# Patient Record
Sex: MALE | Race: WHITE | Employment: FULL TIME | URBAN - METROPOLITAN AREA
[De-identification: names, ages, dates, MRNs, and addresses within clinical notes are randomized per-mention and may not be internally consistent; named-entity substitution may affect disease eponyms.]

---

## 2017-08-01 ENCOUNTER — OFFICE VISIT (OUTPATIENT)
Dept: ORTHOPEDIC SURGERY | Age: 37
End: 2017-08-01
Payer: COMMERCIAL

## 2017-08-01 VITALS
HEART RATE: 59 BPM | HEIGHT: 64 IN | SYSTOLIC BLOOD PRESSURE: 109 MMHG | DIASTOLIC BLOOD PRESSURE: 71 MMHG | OXYGEN SATURATION: 97 % | BODY MASS INDEX: 28 KG/M2 | WEIGHT: 164 LBS

## 2017-08-01 DIAGNOSIS — R26.89 FUNCTIONAL GAIT ABNORMALITY: Primary | ICD-10-CM

## 2017-08-01 DIAGNOSIS — G57.02 PIRIFORMIS SYNDROME OF LEFT SIDE: ICD-10-CM

## 2017-08-01 DIAGNOSIS — S76.312A LEFT HAMSTRING MUSCLE STRAIN, INITIAL ENCOUNTER: ICD-10-CM

## 2017-08-01 DIAGNOSIS — M21.70 LEG LENGTH DISCREPANCY: ICD-10-CM

## 2017-08-01 PROCEDURE — G8419 CALC BMI OUT NRM PARAM NOF/U: HCPCS | Performed by: FAMILY MEDICINE

## 2017-08-01 PROCEDURE — G8427 DOCREV CUR MEDS BY ELIG CLIN: HCPCS | Performed by: FAMILY MEDICINE

## 2017-08-01 PROCEDURE — 99203 OFFICE O/P NEW LOW 30 MIN: CPT | Performed by: FAMILY MEDICINE

## 2017-08-01 PROCEDURE — 1036F TOBACCO NON-USER: CPT | Performed by: FAMILY MEDICINE

## 2017-08-01 RX ORDER — ELECTROLYTES/DEXTROSE
1 SOLUTION, ORAL ORAL
COMMUNITY

## 2017-08-01 ASSESSMENT — PATIENT HEALTH QUESTIONNAIRE - PHQ9
1. LITTLE INTEREST OR PLEASURE IN DOING THINGS: 0
SUM OF ALL RESPONSES TO PHQ QUESTIONS 1-9: 0
SUM OF ALL RESPONSES TO PHQ9 QUESTIONS 1 & 2: 0
2. FEELING DOWN, DEPRESSED OR HOPELESS: 0

## 2017-08-02 ENCOUNTER — HOSPITAL ENCOUNTER (OUTPATIENT)
Dept: PHYSICAL THERAPY | Facility: CLINIC | Age: 37
Setting detail: THERAPIES SERIES
Discharge: HOME | End: 2017-08-02

## 2017-08-02 PROCEDURE — 97750 PHYSICAL PERFORMANCE TEST: CPT

## 2017-08-02 PROCEDURE — 97110 THERAPEUTIC EXERCISES: CPT

## 2017-08-02 PROCEDURE — 97161 PT EVAL LOW COMPLEX 20 MIN: CPT

## 2017-08-02 PROCEDURE — 9990000011 HC NO CHARGE THERAPY VISIT

## 2017-08-11 ENCOUNTER — HOSPITAL ENCOUNTER (OUTPATIENT)
Dept: PHYSICAL THERAPY | Facility: CLINIC | Age: 37
Setting detail: THERAPIES SERIES
Discharge: HOME | End: 2017-08-11

## 2017-08-11 PROCEDURE — 9990000011 HC NO CHARGE THERAPY VISIT

## 2017-08-25 ENCOUNTER — HOSPITAL ENCOUNTER (OUTPATIENT)
Dept: PHYSICAL THERAPY | Facility: CLINIC | Age: 37
Setting detail: THERAPIES SERIES
Discharge: HOME | End: 2017-08-25

## 2017-08-25 PROCEDURE — 9990000011 HC NO CHARGE THERAPY VISIT

## 2017-09-15 ENCOUNTER — HOSPITAL ENCOUNTER (OUTPATIENT)
Dept: PHYSICAL THERAPY | Facility: CLINIC | Age: 37
Setting detail: THERAPIES SERIES
Discharge: HOME | End: 2017-09-15

## 2017-09-15 PROCEDURE — 9990000011 HC NO CHARGE THERAPY VISIT

## 2017-10-13 ENCOUNTER — HOSPITAL ENCOUNTER (OUTPATIENT)
Dept: PHYSICAL THERAPY | Facility: CLINIC | Age: 37
Setting detail: THERAPIES SERIES
Discharge: HOME | End: 2017-10-13

## 2017-10-13 PROCEDURE — 9990000011 HC NO CHARGE THERAPY VISIT

## 2017-10-13 NOTE — FLOWSHEET NOTE
[x] [facility]  [address] [facility]   Phone: [phone]   Fax:  [phone]     Physical Therapy Daily Treatment Note    Date:  10/13/2017  Patient Name:  Chris Cortez    :  1980  MRN: [id number]  Physician: Dr. Azalea rod  Medical Diagnosis: L HS strain, L piriformis strain, functional gait abnormality           Rehab Codes: R26.89, G57.02, S76.312A  Onset date: 2017                            Next Dr's appt.: none  Visit# / total visits:    Cancels/No Shows: 0/0    Subjective:    Pain:  [x] Yes  [] No Location: L HS   Pain Rating: (0-10 scale) 3/10  Worst 7/10 after the race when sitting. Pain altered Tx:  [x] No  [] Yes  Action:  Comments:  Improved compliance with HEP, but still limited. Ran [place] 10k and had a great deal of pain afterwards.       Objective:  Modalities: none  Precautions:standard  Exercises:no ex today, only manual   Exercise Reps/ Time Weight/ Level Comments   Prone         Eccentric HS MRE  1xfatigue x     HIp ext glut max 15 -- added   Gym         Split squats 20 x    Nordic HS curls  15   HEP   CC HS mud scrapes 2x20 15 lbs Marc; for eccentric HS strengthening HEP   Monster walks 4 widths  gold HEP   Lunge walk with SB 6 widths  With ipsilat trunk rotation   Clocks 5x  12-1-2-3-4-5-6-7 HEP   Cups in  L SLS on blue foam 3x5  To 12 o'clock HEP   3 way step downs 15x x HEP 6\"   Supine         HS curls on SB 20 x HEP 9/15   HS walkouts 10  -- HEP   SKTC in static bridge 15  DC HEP   2 legged bridges on SB 20  x HEP Green   Static bridge on SB w/ alt SKTC 15  -- HEP Green   Other: MMT L HS  90 4/5 10 3+ R is 5/5 at 90 and 4+/5 at 10 deg   Manual: DI to L iliacus, psoas, HS, and glut max  Post MMT L HS at 90 5/5 10 deg 4+/5 hip ext improved to 4+   Specific Instructions for next treatment:    Treatment Charges: Mins Units   []  Modalities     []  Ther Exercise     [x]  Manual Therapy 40 3   []

## 2017-11-17 ENCOUNTER — HOSPITAL ENCOUNTER (OUTPATIENT)
Dept: PHYSICAL THERAPY | Facility: CLINIC | Age: 37
Setting detail: THERAPIES SERIES
Discharge: HOME | End: 2017-11-17

## 2017-11-17 PROCEDURE — 9990000011 HC NO CHARGE THERAPY VISIT

## 2017-11-17 NOTE — FLOWSHEET NOTE
[x] [facility] [address] [facility]  [address] [place] [address]   Phone: [phone]   Fax:  [phone]     Physical Therapy Daily Treatment Note    Date:  2017  Patient Name:  Adan Fernandez    :  1980  MRN: [id number]  Physician: Dr. Mikel rod  Medical Diagnosis: L HS strain, L piriformis strain, functional gait abnormality           Rehab Codes: R26.89, G57.02, S76.312A  Onset date: 2017                            Next Dr's appt.: none  Visit# / total visits:    Cancels/No Shows: 0/0    Subjective:    Pain:  [x] Yes  [] No Location: L HS   Pain Rating: (0-10 scale) 0/10  Worst 1-2/10 and random    Pain altered Tx:  [x] No  [] Yes  Action:  Comments:  Ran 8 days consecutive and ran [place] at same pace as last year. Pain with running has improved significantly.   Sitting is still somewhat uncomfortable, but his job has required him to be at the     Objective:  Modalities: none  Precautions:standard  Exercises:no ex today, only manual   Exercise Reps/ Time Weight/ Level Comments   Prone         Eccentric HS MRE  1xfatigue x     HIp ext glut max 15 -- added   Gym         Split squats 20 x    Nordic HS curls  15 x  HEP   CC HS mud scrapes 2x20 15 lbs Marc; for eccentric HS strengthening HEP   Monster walks 4 widths   HEP   Lunge walk with bar 4 widths 12 lbs    Clocks 5x  12-1-2-3-4-5-6-7 HEP   Cups in  L SLS on blue foam 3x5  To 12 o'clock HEP   3 way step downs 15x x HEP 6\"   Supine         HS curls on SB 20 x HEP 9/15   HS walkouts 10  5x HEP   SKTC in static bridge 15  DC HEP   2 legged bridges on SB 20  x HEP Green 2 ways    Static bridge on SB w/ alt SKTC 15  -- HEP Green   Other: MMT L HS at 90 and 10 de/5   Manual: DI to Marc iliacus    Specific Instructions for next treatment:    Treatment Charges: Mins Units   []  Modalities     [x]  Ther Exercise 50 3   []  Manual Therapy     []  Ther Activities     []  Aquatics     [] Vasocompression     []  Other     Total Treatment time 50 3       Assessment: [x] Progressing toward goals. Client's run tolerance has improved drastically. He does continue to demonstrate some instability with his ex, such as with step downs, split squats and eccentric hamstring ex. [] No change. [] Other:    STG: (to be met in 5 treatments)  1. ? Pain: <3/10 at the worst MET   2. ? Strength:to 5/5 with all hip and HS movements MET  3. ? Function: able to run 2 miles without increased pain. MET  4. Independent with Home Exercise Programs     LTG: (to be met in 8 treatments)  1. Resume full running and cycling program    Pt. Education:  [x] Yes  [] No  [] Reviewed Prior HEP/Ed  Method of Education: [x] Verbal  [] Demo  [] Written     Comprehension of Education:  [] Verbalizes understanding. [] Demonstrates understanding. [] Needs review. [x] Demonstrates/verbalizes HEP/Ed previously given. Plan: [x] Continue per plan of care. He wishes to be seen in 4 wks. If he continues to improve and is able to resume a normal training schedule, DC to HEP on next visit.     [] Other:      Time In:0900  Time Out: 1000  Electronically signed by:  Lidya Llamas PT

## 2017-12-18 ENCOUNTER — HOSPITAL ENCOUNTER (OUTPATIENT)
Dept: PHYSICAL THERAPY | Facility: CLINIC | Age: 37
Setting detail: THERAPIES SERIES
Discharge: HOME | End: 2017-12-18

## 2017-12-26 ENCOUNTER — HOSPITAL ENCOUNTER (OUTPATIENT)
Dept: PHYSICAL THERAPY | Facility: CLINIC | Age: 37
Setting detail: THERAPIES SERIES
Discharge: HOME | End: 2017-12-26

## 2017-12-26 PROCEDURE — 9990000011 HC NO CHARGE THERAPY VISIT

## 2017-12-26 NOTE — FLOWSHEET NOTE
[x] [facility]  [address] [place] [address]   Phone: [phone]   Fax:  [phone]     Physical Therapy Daily Treatment Note    Date:  2017  Patient Name:  Ashley Hodges    :  1980  MRN: [id number]  Physician: Dr. Aurea Comer pay  Medical Diagnosis: L HS strain, L piriformis strain, functional gait abnormality           Rehab Codes: R26.89, G57.02, S76.312A  Onset date: 2017                            Next Dr's appt.: none  Visit# / total visits:    Cancels/No Shows: 0/0    Subjective:    Pain:  [x] Yes  [] No Location: L HS   Pain Rating: (0-10 scale) 0/10  Worst 1-2/10 and random    Pain altered Tx:  [x] No  [] Yes  Action:  Comments:  Signing up for full [facility]. Hamstring has been holding up well. Running 5k every day (7 days/wk)at 8:40 mile.   He reports compliance with HEP is fair at best.  As for goals for today, he would like to review HEP        Objective:  Modalities: none  Precautions:standard  Exercises:no ex today, only manual   Exercise Reps/ Time Weight/ Level Comments   Lat elliptical 8' L3 Alt every 2 min   Prone         Eccentric HS MRE  1xfatigue      HIp ext  2x15 x Alt knee extended and flexed   Gym         Split squats 20 x    Nordic HS curls  15 x  HEP; con and eccentric   CC HS mud scrapes 2x20 10lbs Marc; for eccentric HS strengthening HEP   Monster walks 4 widths   HEP   Lunge walk with bar 4 widths 12 lbs    Clocks 5x  12-1-2-3-4-5-6-7 HEP   Cups in  L SLS on blue foam 5x5 x To 12 o'clock HEP   3 way step downs 15x x HEP 6\"   Supine         HS curls on SB 20 x HEP 9/15   HS walkouts 10 x HEP   SKTC in static bridge 15  DC HEP   2 legged bridges on SB 20  x HEP Green 2 ways    [employer] 20 x    Other: Manual: DI to Marc iliacus    Specific Instructions for next treatment:    Treatment Charges: Mins Units   []  Modalities     []  Ther Exercise     []  Manual Therapy     []  Ther Activities     []

## 2019-03-08 ENCOUNTER — HOSPITAL ENCOUNTER (OUTPATIENT)
Dept: PHYSICAL THERAPY | Facility: CLINIC | Age: 39
Discharge: HOME | End: 2019-03-08

## 2019-03-08 PROCEDURE — 9900000073 HC MANUAL THERAPY PER 15 MIN (SELF-PAY)

## 2019-03-08 PROCEDURE — 9900000066 HC EVALUATION (SELF-PAY)

## 2019-03-11 ENCOUNTER — HOSPITAL ENCOUNTER (OUTPATIENT)
Dept: PHYSICAL THERAPY | Facility: CLINIC | Age: 39
Discharge: HOME | End: 2019-03-11

## 2019-03-11 PROCEDURE — 9900000067 HC THERAPEUTIC EXERCISE EA 15 MINS (SELF-PAY)

## 2019-03-11 PROCEDURE — 9900000073 HC MANUAL THERAPY PER 15 MIN (SELF-PAY)

## 2019-03-18 ENCOUNTER — HOSPITAL ENCOUNTER (OUTPATIENT)
Dept: PHYSICAL THERAPY | Facility: CLINIC | Age: 39
Discharge: HOME | End: 2019-03-18

## 2019-03-18 PROCEDURE — 9900000067 HC THERAPEUTIC EXERCISE EA 15 MINS (SELF-PAY)

## 2019-04-01 ENCOUNTER — HOSPITAL ENCOUNTER (OUTPATIENT)
Dept: PHYSICAL THERAPY | Facility: CLINIC | Age: 39
Discharge: HOME | End: 2019-04-01

## 2019-04-01 PROCEDURE — 9900000067 HC THERAPEUTIC EXERCISE EA 15 MINS (SELF-PAY)

## 2019-04-01 NOTE — FLOWSHEET NOTE
S 3x30\"   4/1 x     Monster walks  4x25' blue 3/18   issued blue TB   Hamstring curls 20 L8      Eccentric hamstring curls 20 10 lbs      Split squats 20   3/18      Step downs  20 4\" 4/1 x posterior   Ball squats on wall 20 blue 4/1 x    Hover squats with ball on wall 20  4/1 x    Squats on cable column 2x15 35 lbs 4/1 x Belt on inf glut fold   Chair scoots 1 small loop  4/1 x    Other:  Manual: none required due to full ROM and no need per pt      Specific Instructions for next treatment:add hip ex    Treatment Charges: Mins Units   []  Modalities     [x]  Ther Exercise 40 3   []  Manual Therapy     []  Ther Activities     []  Aquatics     []  Vasocompression     []  Other     Total Treatment time 43 3 self pay       Assessment: [x] Progressing toward goals. No pain with today's program.  Client exhibited difficulty with hip strategy with step downs and squats. Modified home program to make it more pleasing to pt as this should help maximize compliance per pt.      [] No change. [] Other:    STG: (to be met in 5 treatments)  1. ? Pain: <5/10 at the worst; 1-2/10 on a constant level  2. ? Strength: at least 4+ with all hip movements  3. ? Function: able to run 1 mile 3x/wk with no pain  4. Independent with Home Exercise Programs  5. Increase UWRI score >20/36     LTG: (to be met in 10 treatments)  1. Pain 2/10 at the worst, no constant pain  2. 5/5 with all hip movements  3. Able to run 4 miles  3x/wk with no pain  4. UWRI score >30/36                 Patient goals:\"back to running--lose weight\"    Pt. Education:  [x] Yes  [] No  [x] Reviewed Prior HEP/Ed  Method of Education: [] Verbal  [x] Demo  [x] Written see above 4/1 ex for new HEP  Comprehension of Education:  [] Verbalizes understanding. [] Demonstrates understanding. [] Needs review. [x] Demonstrates/verbalizes HEP/Ed previously given. Plan: [x] Continue per plan of care.  1x/wk per pt request   [] Other:      Time In: 5682           Time Out: 100    Electronically signed by:  Lisa Puentes, PT

## 2019-04-12 ENCOUNTER — HOSPITAL ENCOUNTER (OUTPATIENT)
Dept: PHYSICAL THERAPY | Facility: CLINIC | Age: 39
Discharge: HOME | End: 2019-04-12

## 2019-04-12 PROCEDURE — 9900000067 HC THERAPEUTIC EXERCISE EA 15 MINS (SELF-PAY)

## 2019-04-12 NOTE — FLOWSHEET NOTE
kneeling lunge S 3x30\"   4/1 x     Monster walks  4x25' black 3/18 x  issued blackTB   Hamstring curls 20 L8  --    Eccentric hamstring curls 20 10 lbs  DC    Split squats 20   3/18 x     Step overs  20 4\" 4/1 x Posterior-anterior   Hover squats with ball on wall 20  4/1 x    Chair scoots 1 small loop  4/1 x    Other:  Manual: none required due to full ROM except + jaime distal biceps femoris     Specific Instructions for next treatment:    Treatment Charges: Mins Units   []  Modalities     [x]  Ther Exercise 45 3   []  Manual Therapy     []  Ther Activities     []  Aquatics     []  Vasocompression     []  Other     Total Treatment time 45 3 self pay       Assessment: [x] Progressing toward goals. No pain with today's program.  Client exhibited difficulty with hip strategy with step downs and squats. Even with modified program, he continues to demonstrate fair compliance     [] No change. [] Other:    STG: (to be met in 5 treatments)  1. ? Pain: <5/10 at the worst; 1-2/10 on a constant level  2. ? Strength: at least 4+ with all hip movements  3. ? Function: able to run 1 mile 3x/wk with no pain  4. Independent with Home Exercise Programs  5. Increase UWRI score >20/36     LTG: (to be met in 10 treatments)  1. Pain 2/10 at the worst, no constant pain  2. 5/5 with all hip movements  3. Able to run 4 miles  3x/wk with no pain  4. UWRI score >30/36                 Patient goals:\"back to running--lose weight\"    Pt. Education:  [x] Yes  [] No  [x] Reviewed Prior HEP/Ed  Method of Education: [] Verbal  [x] Demo  [x] Written no new changes to program  Comprehension of Education:  [] Verbalizes understanding. [] Demonstrates understanding. [] Needs review. [x] Demonstrates/verbalizes HEP/Ed previously given. Plan: [x] Continue per plan of care.  1x/wk per pt request.  If he continues to have pain running, consider using the alter G.     [] Other:      Time In: 0900           Time Out: 1000    Electronically signed by:  Geoff Victoria, PT

## 2019-04-22 ENCOUNTER — HOSPITAL ENCOUNTER (OUTPATIENT)
Dept: PHYSICAL THERAPY | Facility: CLINIC | Age: 39
Discharge: HOME | End: 2019-04-22

## 2019-04-22 NOTE — FLOWSHEET NOTE
[] [facility] [facility]  [address]    [phone]  F: [phone]   [] [address] [facility] [address]   [address]  P: [phone]  F: [phone]  [x] [place]  [address] [facility]  P: [phone]  F: [phone]   [] [address] [facility] [address]   [place]: [phone]  F: [phone]  [] [place] [address] [facility] [address]  [place]: [phone]   F: [phone]     Physical Therapy Cancel/No Show note    Date: 2019  Patient: Howard Singh  : 1980  MRN: [id number]    Cancels/No Shows to date:     For today's appointment patient:    [x]  Cancelled    [] Rescheduled appointment    [] No-show     Reason given by patient:    []  Patient ill    []  Conflicting appointment    [] No transportation      [] Conflict with work    [x] No reason given    [] Weather related    [] Other:      Comments:  Patient left a voicemail stating that he would like to RS     [] Next appointment was confirmed    Electronically signed by: Amy Rahman